# Patient Record
Sex: MALE
[De-identification: names, ages, dates, MRNs, and addresses within clinical notes are randomized per-mention and may not be internally consistent; named-entity substitution may affect disease eponyms.]

---

## 2017-03-20 ENCOUNTER — HOSPITAL ENCOUNTER (OUTPATIENT)
Dept: HOSPITAL 42 - ENDO | Age: 42
Discharge: HOME | End: 2017-03-20
Payer: MEDICAID

## 2017-03-20 VITALS
RESPIRATION RATE: 18 BRPM | SYSTOLIC BLOOD PRESSURE: 128 MMHG | TEMPERATURE: 98.2 F | DIASTOLIC BLOOD PRESSURE: 73 MMHG | OXYGEN SATURATION: 98 %

## 2017-03-20 VITALS — BODY MASS INDEX: 37.3 KG/M2

## 2017-03-20 VITALS — HEART RATE: 73 BPM

## 2017-03-20 DIAGNOSIS — B96.81: ICD-10-CM

## 2017-03-20 DIAGNOSIS — R05: ICD-10-CM

## 2017-03-20 DIAGNOSIS — K21.9: Primary | ICD-10-CM

## 2017-03-20 DIAGNOSIS — K29.50: ICD-10-CM

## 2017-03-20 PROCEDURE — 88305 TISSUE EXAM BY PATHOLOGIST: CPT

## 2017-03-20 PROCEDURE — 43239 EGD BIOPSY SINGLE/MULTIPLE: CPT

## 2017-03-20 PROCEDURE — 88342 IMHCHEM/IMCYTCHM 1ST ANTB: CPT

## 2017-03-20 PROCEDURE — 88312 SPECIAL STAINS GROUP 1: CPT

## 2017-08-05 ENCOUNTER — HOSPITAL ENCOUNTER (EMERGENCY)
Dept: HOSPITAL 42 - ED | Age: 42
Discharge: HOME | End: 2017-08-05
Payer: MEDICAID

## 2017-08-05 VITALS
HEART RATE: 101 BPM | DIASTOLIC BLOOD PRESSURE: 89 MMHG | SYSTOLIC BLOOD PRESSURE: 135 MMHG | RESPIRATION RATE: 16 BRPM | OXYGEN SATURATION: 98 % | TEMPERATURE: 98.1 F

## 2017-08-05 VITALS — BODY MASS INDEX: 37.3 KG/M2

## 2017-08-05 DIAGNOSIS — M79.671: ICD-10-CM

## 2017-08-05 DIAGNOSIS — M72.2: Primary | ICD-10-CM

## 2017-08-05 NOTE — ED PDOC
Arrival/HPI





- General


Chief Complaint: Lower Extremity Problem/Injury


Time Seen by Provider: 08/05/17 21:27


Historian: Patient





- History of Present Illness


Narrative History of Present Illness (Text): 


08/05/17 22:06


42 year old male, with no significant past medical history, who presents to the 

Emergency department complaining of atraumatic right foot pain to the bottom of 

his foot. Patient states pain is worse with walking or bearing weight on the 

extremity. Patient states he has not experienced similar pain previously. 

Patient denies any fever, chills, joint pain, numbness/weakness/tingling in the 

extremity, back pain, neck pain, headache, dizziness, or any other complaints.


PMD Stuart





Symptom Onset: Gradual


Symptom Course: Unchanged


Activities at Onset: Light


Context: Standing, Walking, Home





Past Medical History





- Provider Review


Nursing Documentation Reviewed: Yes





- Infectious Disease


Hx of Infectious Diseases: None





- Past Medical History


Past Medical History: No Previous





- Cardiac


Hx Pacemaker: No





- Pulmonary


Hx Respiratory Disorders: Yes


Hx Bronchitis: Yes


Hx Pneumonia: Yes





- Neurological


Hx Paralysis: No





- HEENT


Hx HEENT Disorder: No





- Renal


Hx Renal Disorder: No





- Endocrine/Metabolic


Hx Endocrine Disorders: No





- Hematological/Oncological


Hx Blood Transfusions: No


Hx Blood Transfusion Reaction: No





- Integumentary


Hx Dermatological Disorder: No





- Musculoskeletal/Rheumatological


Hx Musculoskeletal Disorders: Yes





- Gastrointestinal


Hx Gastrointestinal Disorders: Yes (PERIRECTAL ABSCESS)


Other/Comment: Gallstones





- Genitourinary/Gynecological


Hx Genitourinary Disorders: No





- Psychiatric


Hx Emotional Abuse: No


Hx Physical Abuse: No


Hx Substance Use: No





- Past Surgical History


Past Surgical History: No Previous





- Surgical History


Other/Comment: anal fistula/fissure repair





- Anesthesia


Hx Anesthesia Reactions: No


Hx Malignant Hyperthermia: No





- Suicidal Assessment


Feels Threatened In Home Enviroment: No





Family/Social History





- Physician Review


Nursing Documentation Reviewed: Yes


Family/Social History: Unknown Family HX


Smoking Status: Never Smoked


Hx Alcohol Use: No


Hx Substance Use: No


Hx Substance Use Treatment: No





Allergies/Home Meds


Allergies/Adverse Reactions: 


Allergies





shrimp Allergy (Intermediate, Verified 03/13/17 12:46)


 RASH


SEASONAL Allergy (Intermediate, Uncoded 03/13/17 12:46)


 UNKNOWN


 PT RECENTLY HAD ALLERGY TESTING- SHOWWED MULTIPLE REACTIONS TO MOLD, TREES 











Review of Systems





- Physician Review


All systems were reviewed & negative as marked: Yes





- Review of Systems


Constitutional: Normal.  absent: Fevers


Eyes: Normal


ENT: Normal


Respiratory: Normal.  absent: SOB, Cough


Cardiovascular: Normal.  absent: Chest Pain


Gastrointestinal: Normal.  absent: Abdominal Pain, Diarrhea, Nausea, Vomiting


Genitourinary Male: Normal.  absent: Dysuria, Frequency, Hematuria, Urinary 

Output Changes


Musculoskeletal: Other (+right foot pain).  absent: Arthralgias, Back Pain, 

Neck Pain


Skin: Normal.  absent: Rash


Neurological: Normal.  absent: Headache, Dizziness


Endocrine: Normal


Hemo/Lymphatic: Normal


Psychiatric: Normal





Physical Exam


Vital Signs Reviewed: Yes


Vital Signs











  Temp Pulse Resp BP Pulse Ox


 


 08/05/17 21:22  98.1 F  101 H  16  135/89  98











Temperature: Afebrile


Blood Pressure: Normal


Pulse: Regular


Respiratory Rate: Normal


Appearance: Positive for: Well-Appearing, Non-Toxic, Comfortable


Pain Distress: None


Mental Status: Positive for: Alert and Oriented X 3





- Systems Exam


Head: Present: Atraumatic, Normocephalic


Pupils: Present: PERRL


Extroacular Muscles: Present: EOMI


Conjunctiva: Present: Normal


Mouth: Present: Moist Mucous Membranes


Lower Extremity: Present: NORMAL PULSES, Normal ROM, Tenderness, 

Neurovascularly Intact (Tenderness to plantar aspect of right foot), Capillary 

Refill < 2 s.  No: Edema, Swelling, Erythema, Deformity


Neurological: Present: GCS=15, CN II-XII Intact, Speech Normal


Skin: Present: Warm, Dry, Normal Color.  No: Rashes


Psychiatric: Present: Alert, Oriented x 3, Normal Insight, Normal Concentration





Medical Decision Making


ED Course and Treatment: 


08/05/17 22:06


Impression:


42 year old male complaining of right foot pain to plantar aspect.





Differential Diagnosis included but are not limited to:  plantar fasciitis vs. 

tendonitis vs. gout





Plan:


-- Naproxen


-- XR Right Foot


-- Reassess and disposition





Progress Notes:


XR right foot: +heel spur, +faint soft tissue swelling to the plantar aspect of 

the foot and the ankle, no fracture, no dislocation, as read by PA


Patient advised that official radiology read of XR is still pending and will 

call the patient if there is any discrepancy within 24 hours.  





X-ray results reviewed and discussed with the patient in great detail. Patient 

advised of likely diagnosis of plantar fasciitis and the need for follow-up 

with a foot specialist. Advised to rest affected foot, elevate and apply ice. 

Otherwise instructed to follow up with podiatry referral in 1-2 days without 

fail. Advised to take medication as prescribed. Return to the emergency room at 

any time for any new or worsening symptoms.


Patient states he fully agrees with and understands discharge instructions. 

States that he agrees with the plan and disposition. Verbalized and repeated 

discharge instructions and plan. I have given the patient opportunity to ask 

any additional questions. 








- RAD Interpretation


Radiology Orders: 








08/05/17 22:09


FOOT RIGHT 3 VIEWS ROUTINE [RAD] Stat 














- Medication Orders


Current Medication Orders: 











Discontinued Medications





Naproxen (Anaprox Ds)  550 mg PO ONCE STA


   Stop: 08/05/17 22:10


   Last Admin: 08/05/17 22:31  Dose: 550 mg











- PA / NP / Resident Statement


MD/DO has reviewed & agrees with the documentation as recorded.





- Scribe Statement


The provider has reviewed the documentation as recorded by the Scribe


Linda Donovan





All medical record entries made by the Scribe were at my direction and 

personally dictated by me. I have reviewed the chart and agree that the record 

accurately reflects my personal performance of the history, physical exam, 

medical decision making, and the department course for this patient. I have 

also personally directed, reviewed, and agree with the discharge instructions 

and disposition.





Disposition/Present on Arrival





- Present on Arrival


Any Indicators Present on Arrival: No


History of DVT/PE: No


History of Uncontrolled Diabetes: No


Urinary Catheter: No


History of Decub. Ulcer: No


History Surgical Site Infection Following: None





- Disposition


Have Diagnosis and Disposition been Completed?: Yes


Diagnosis: 


 Foot pain, right, Plantar fasciitis of right foot





Disposition: HOME/ ROUTINE


Disposition Time: 23:00


Patient Plan: Discharge


Patient Problems: 


 Current Active Problems











Problem Status Onset


 


Foot pain, right Acute  


 


Plantar fasciitis of right foot Acute  











Condition: STABLE


Discharge Instructions (ExitCare):  Plantar Fasciitis (ED)


Print Language: ENGLISH


Additional Instructions: 


Thank you for letting us take care of you today. You were treated for right 

foot pain, plantar fasciitis. The emergency medical care you received today was 

directed at your acute symptoms. If you were prescribed any medication, please 

fill it and take as directed. It may take several days for your symptoms to 

resolve. Return to the Emergency Department if your symptoms worsen, do not 

improve, or if you have any other problems.





Please contact your doctor in 2 days for re-evaluation and follow up / or call 

one of the physicians/clinics you have been referred to that are listed on the 

Patient Visit Information form that is included in your discharge packet. Bring 

any paperwork you were given at discharge with you along with any medications 

you are taking to your follow up visit. Our treatment cannot replace ongoing 

medical care by a primary care provider (PCP) outside of the emergency 

department.





Thank you for allowing the [facility] team to be part of your care today.








Prescriptions: 


Naproxen 500 mg PO BID #30 tab


Referrals: 


Cheryl Stuart MD [Primary Care Provider] - Follow up with primary


Forms:  [employer]gance (English)

## 2018-04-11 ENCOUNTER — HOSPITAL ENCOUNTER (EMERGENCY)
Dept: HOSPITAL 42 - ED | Age: 43
Discharge: HOME | End: 2018-04-11
Payer: MEDICAID

## 2018-04-11 VITALS
HEART RATE: 77 BPM | SYSTOLIC BLOOD PRESSURE: 138 MMHG | DIASTOLIC BLOOD PRESSURE: 65 MMHG | RESPIRATION RATE: 18 BRPM | OXYGEN SATURATION: 98 % | TEMPERATURE: 98 F

## 2018-04-11 VITALS — BODY MASS INDEX: 36.2 KG/M2

## 2018-04-11 DIAGNOSIS — K76.0: ICD-10-CM

## 2018-04-11 DIAGNOSIS — N50.3: Primary | ICD-10-CM

## 2018-04-11 DIAGNOSIS — R16.1: ICD-10-CM

## 2018-04-11 DIAGNOSIS — R10.9: ICD-10-CM

## 2018-04-11 LAB
ALBUMIN SERPL-MCNC: 4.6 G/DL (ref 3–4.8)
ALBUMIN/GLOB SERPL: 1.4 {RATIO} (ref 1.1–1.8)
ALT SERPL-CCNC: 101 U/L (ref 7–56)
APPEARANCE UR: CLEAR
AST SERPL-CCNC: 43 U/L (ref 17–59)
BASOPHILS # BLD AUTO: 0.03 K/MM3 (ref 0–2)
BASOPHILS NFR BLD: 0.5 % (ref 0–3)
BILIRUB UR-MCNC: NEGATIVE MG/DL
BUN SERPL-MCNC: 20 MG/DL (ref 7–21)
CALCIUM SERPL-MCNC: 10.4 MG/DL (ref 8.4–10.5)
COLOR UR: YELLOW
EOSINOPHIL # BLD: 0.1 10*3/UL (ref 0–0.7)
EOSINOPHIL NFR BLD: 2 % (ref 1.5–5)
ERYTHROCYTE [DISTWIDTH] IN BLOOD BY AUTOMATED COUNT: 13.5 % (ref 11.5–14.5)
GFR NON-AFRICAN AMERICAN: > 60
GLUCOSE UR STRIP-MCNC: NEGATIVE MG/DL
GRANULOCYTES # BLD: 3.16 10*3/UL (ref 1.4–6.5)
GRANULOCYTES NFR BLD: 49.5 % (ref 50–68)
HGB BLD-MCNC: 15.5 G/DL (ref 14–18)
LEUKOCYTE ESTERASE UR-ACNC: NEGATIVE LEU/UL
LIPASE SERPL-CCNC: 64 U/L (ref 23–300)
LYMPHOCYTES # BLD: 2.4 10*3/UL (ref 1.2–3.4)
LYMPHOCYTES NFR BLD AUTO: 37.5 % (ref 22–35)
MCH RBC QN AUTO: 31.4 PG (ref 25–35)
MCHC RBC AUTO-ENTMCNC: 36 G/DL (ref 31–37)
MCV RBC AUTO: 87.2 FL (ref 80–105)
MONOCYTES # BLD AUTO: 0.7 10*3/UL (ref 0.1–0.6)
MONOCYTES NFR BLD: 10.5 % (ref 1–6)
PH UR STRIP: 5.5 [PH] (ref 4.7–8)
PLATELET # BLD: 226 10^3/UL (ref 120–450)
PMV BLD AUTO: 9.7 FL (ref 7–11)
PROT UR STRIP-MCNC: NEGATIVE MG/DL
RBC # BLD AUTO: 4.94 10^6/UL (ref 3.5–6.1)
RBC # UR STRIP: NEGATIVE /UL
SP GR UR STRIP: >= 1.03 (ref 1–1.03)
UROBILINOGEN UR STRIP-ACNC: 0.2 E.U./DL
WBC # BLD AUTO: 6.4 10^3/UL (ref 4.5–11)

## 2018-04-11 PROCEDURE — 81003 URINALYSIS AUTO W/O SCOPE: CPT

## 2018-04-11 PROCEDURE — 74177 CT ABD & PELVIS W/CONTRAST: CPT

## 2018-04-11 PROCEDURE — 96361 HYDRATE IV INFUSION ADD-ON: CPT

## 2018-04-11 PROCEDURE — 80053 COMPREHEN METABOLIC PANEL: CPT

## 2018-04-11 PROCEDURE — 93975 VASCULAR STUDY: CPT

## 2018-04-11 PROCEDURE — 83690 ASSAY OF LIPASE: CPT

## 2018-04-11 PROCEDURE — 96374 THER/PROPH/DIAG INJ IV PUSH: CPT

## 2018-04-11 PROCEDURE — 99285 EMERGENCY DEPT VISIT HI MDM: CPT

## 2018-04-11 PROCEDURE — 85025 COMPLETE CBC W/AUTO DIFF WBC: CPT

## 2018-04-11 NOTE — CT
PROCEDURE:  CT Abdomen and Pelvis with contrast



HISTORY:

Right-sided abdominal pain, 2 weeks duration. 



COMPARISON:

None



TECHNIQUE:

Contrast dose: 150 cc Omnipaque 350.



Radiation dose:



Total exam DLP = 1184.53 mGy-cm.



This CT exam was performed using one or more of the following dose 

reduction techniques: Automated exposure control, adjustment of the 

mA and/or kV according to patient size, and/or use of iterative 

reconstruction technique.



FINDINGS:



LOWER THORAX:

Unremarkable. 



LIVER:

 Hepatic steatosis. No focal masses.  No intrahepatic bile duct 

dilatation or perihepatic ascites.  



GALLBLADDER AND BILE DUCTS:

Unremarkable. 



PANCREAS:

Unremarkable. No gross lesion or ductal dilatation.



SPLEEN:

Splenomegaly.  Orthogonal measurements 7.1 x 13.9 cm. 



ADRENALS:

Unremarkable. No mass. 



KIDNEYS AND URETERS:

Unremarkable. No hydronephrosis. No solid mass. 



VASCULATURE:

Unremarkable. No aortic aneurysm. 



BOWEL:

Unremarkable. No obstruction. No gross mural thickening. 



APPENDIX:

Normal appendix. 



PERITONEUM:

Unremarkable. No free fluid. No free air. 



LYMPH NODES:

Unremarkable. No enlarged lymph nodes. 



BLADDER:

Unremarkable. 



REPRODUCTIVE:

Unremarkable. 



BONES:

No acute fracture. 



OTHER FINDINGS:

None.



IMPRESSION:

No significant or acute  findings to account for/ related to the 

clinical presentation.



Additional benign and/or incidental findings described above.

## 2018-04-11 NOTE — ED PDOC
Arrival/HPI





- General


Chief Complaint: Male Genitourinary


Time Seen by Provider: 04/11/18 10:01


Historian: Patient





- History of Present Illness


Narrative History of Present Illness (Text): 





04/11/18 15:47


42yr old male presents today with a 2 week history of right-sided groin pain 

radiating into the abdomen. Patient states pain started approximately 2 weeks 

ago when he got home from work. Patient denies nausea/vomiting or diarrhea. pt 

denies cp or sob. pt states he does heavy lifting at work and is worried about 

hernia. pt states he has noticed that he has been having harder bowel 

movements. pt denies dysuria. denies urinary frequency. pt denies back pain. pt 

states pain starts deep around the right testicle and radiates to the right 

upper abdomen. pt states pain only occurs with movement. pt states while at 

rest he has no pain. no fever/chills. no other complaints. 





Past Medical History





- Provider Review


Nursing Documentation Reviewed: Yes





- Travel History


Have you recently traveled outside [place] w/in the past 3 mons?: No





- Infectious Disease


Hx of Infectious Diseases: None





- Past Medical History


Past Medical History: No Previous





- Cardiac


Hx Cardiac Disorders: No


Hx Pacemaker: No





- Pulmonary


Hx Respiratory Disorders: Yes


Hx Bronchitis: Yes


Hx Pneumonia: Yes





- Neurological


Hx Neurological Disorder: No





- HEENT


Hx HEENT Disorder: No





- Renal


Hx Renal Disorder: No





- Endocrine/Metabolic


Hx Endocrine Disorders: No





- Hematological/Oncological


Hx Blood Disorders: No





- Integumentary


Hx Dermatological Disorder: No





- Musculoskeletal/Rheumatological


Hx Musculoskeletal Disorders: Yes





- Gastrointestinal


Hx Gastrointestinal Disorders: Yes (PERIRECTAL ABSCESS)


Other/Comment: Gallstones





- Genitourinary/Gynecological


Hx Genitourinary Disorders: No





- Psychiatric


Hx Psychophysiologic Disorder: No


Hx Substance Use: No





- Past Surgical History


Past Surgical History: No Previous





- Surgical History


Other/Comment: anal fistula/fissure repair





- Anesthesia


Hx Anesthesia Reactions: No


Hx Malignant Hyperthermia: No





- Suicidal Assessment


Feels Threatened In Home Enviroment: No





Family/Social History





- Physician Review


Nursing Documentation Reviewed: Yes


Family/Social History: Unknown Family HX


Smoking Status: Never Smoked


Hx Alcohol Use: No


Hx Substance Use: No


Hx Substance Use Treatment: No





Allergies/Home Meds


Allergies/Adverse Reactions: 


Allergies





shrimp Allergy (Intermediate, Verified 04/11/18 09:45)


 RASH


SEASONAL Allergy (Intermediate, Uncoded 04/11/18 09:45)


 UNKNOWN


 PT RECENTLY HAD ALLERGY TESTING- SHOWWED MULTIPLE REACTIONS TO MOLD, TREES 











Review of Systems





- Review of Systems


Constitutional: absent: Fatigue, Fevers


Respiratory: absent: SOB, Cough


Cardiovascular: absent: Chest Pain, Palpitations


Gastrointestinal: Abdominal Pain.  absent: Constipation, Diarrhea, Nausea, 

Vomiting


Genitourinary Male: Other (right sided testicular pain).  absent: Dysuria, 

Frequency, Hematuria


Musculoskeletal: absent: Arthralgias, Back Pain, Neck Pain


Skin: absent: Rash, Pruritis


Neurological: absent: Headache, Dizziness


Psychiatric: absent: Anxiety, Depression, Suicidal Ideation





Physical Exam


Vital Signs Reviewed: Yes


Vital Signs











  Temp Pulse Resp BP Pulse Ox


 


 04/11/18 16:02  98 F  77  18  138/65  98


 


 04/11/18 14:09   70  16  122/71  99


 


 04/11/18 12:00   70  16  125/83  100


 


 04/11/18 09:33  99.5 F  78  18  148/97 H  97











Temperature: Afebrile


Blood Pressure: Hypertensive


Pulse: Regular


Respiratory Rate: Normal


Appearance: Positive for: Well-Appearing, Non-Toxic, Comfortable


Pain Distress: None


Mental Status: Positive for: Alert and Oriented X 3





- Systems Exam


Head: Present: Atraumatic


Mouth: Present: Moist Mucous Membranes


Neck: Present: Normal Range of Motion


Respiratory/Chest: Present: Clear to Auscultation, Good Air Exchange.  No: 

Respiratory Distress, Accessory Muscle Use


Cardiovascular: Present: Regular Rate and Rhythm, Normal S1, S2.  No: Murmurs


Abdomen: Present: Tenderness (+ right lower abdominal tenderness; ).  No: 

Distention, Peritoneal Signs, Rebound, Guarding, Hernias


Genitourinary Male: Present: Normal External Genitalia, Circumcised Penis, 

Testicle Tenderness (minimal right sided tenderness), Other (chaparoned by 

ivelisse coats RN. ).  No: Lesions, Penile Discharge, Penile Swelling, Masses, 

Erythema, Hernias, Testicle Swelling


Back: Present: Normal Inspection.  No: CVA Tenderness, Midline Tenderness, 

Paraspinal Tenderness


Upper Extremity: Present: Normal ROM


Lower Extremity: Present: Normal Inspection


Neurological: Present: GCS=15, Speech Normal


Skin: Present: Warm, Dry, Normal Color.  No: Rashes


Psychiatric: Present: Alert, Oriented x 3





Medical Decision Making


ED Course and Treatment: 





04/11/18 15:54


Patient is nontoxic well appearing with stable vital signs presenting with 2 

week history of right sided abdominal pain





CBC wnl


CMP wnl


Lipase wnl





Urinalysis: wnl





Ultrasound testicular:FINDINGS:


RIGHT TESTICLE:


Measures 2.6 x 3.2 x 5.1 cm. Normal echotexture and flow.


RIGHT EPIDIDYMIS:


Epididymal head measures 1.2 x 1.2 cm. Right epididymal tubular structure 

likely a cyst 1 x 1.2 by 2.1 cm


LEFT TESTICLE:


Measures 2.6 x 3.1 x 5.2 cm. Normal echotexture and flow.


LEFT EPIDIDYMIS:


Epididymal head measures 1.2 x 1.1 cm. Tubular structure in the body of the 

left epididymis 0.9 x 3.6 cm


HYDROCELE:


None.


VARICOCELE:


Unilateral, left.


OTHER FINDINGS:


None. 


IMPRESSION:


Negative study for epididymitis, orchitis or torsion.


Bilateral tubular epididymal cysts.








CAT scan:FINDINGS:


LOWER THORAX:


Unremarkable. 


LIVER:


 Hepatic steatosis. No focal masses.  No intrahepatic bile duct dilatation or 

perihepatic ascites.  


GALLBLADDER AND BILE DUCTS:


Unremarkable. 


PANCREAS:


Unremarkable. No gross lesion or ductal dilatation.


SPLEEN:


Splenomegaly.  Orthogonal measurements 7.1 x 13.9 cm. 


ADRENALS:


Unremarkable. No mass. 


KIDNEYS AND URETERS:


Unremarkable. No hydronephrosis. No solid mass. 


VASCULATURE:


Unremarkable. No aortic aneurysm. 


BOWEL:


Unremarkable. No obstruction. No gross mural thickening. 


APPENDIX:


Normal appendix. 


PERITONEUM:


Unremarkable. No free fluid. No free air. 


LYMPH NODES:


Unremarkable. No enlarged lymph nodes. 


BLADDER:


Unremarkable. 


REPRODUCTIVE:


Unremarkable. 


BONES:


No acute fracture. 


OTHER FINDINGS:


None.


IMPRESSION:


No significant or acute  findings to account for/ related to the clinical 

presentation.


Additional benign and/or incidental findings described above.








Patient reassessment: pt denies any pain at rest; abdomen is non tender; 





i discussed all results in depth with patient; advised f/u with pmd and GI 

specialist. i advised f/u with urologist. i advised immediate return if 

symptoms worsen,persist or if new symptoms develop. 





Patient verbalizes understanding of discharge instructions and need for 

immediate followup.








all aspects of this case were discussed the attending of record. 











Impression: Abdominal pain, groin pain


Motrin every 6 hours as needed for pain


Flexeril 1 tablet every 8 hours as needed for muscle spasms; may cause 

drowsiness. 


Follow up with the GI specialist within the next 2 days. 


Follow up with the urologist within the next 2 days. 


Follow up with primary care physician within the next 2 days


Return immediately if symptoms worsen persist or if new symptoms develop: High 

fevers, increasing pain, vomiting, diarrhea or any other concerning symptoms 

develop





- Lab Interpretations


Lab Results: 








 04/11/18 10:30 





 04/11/18 10:30 





 Lab Results





04/11/18 10:30: WBC 6.4, RBC 4.94, Hgb 15.5, Hct 43.1, MCV 87.2, MCH 31.4, MCHC 

36.0, RDW 13.5, Plt Count 226, MPV 9.7, Gran % 49.5 L, Lymph % (Auto) 37.5 H, 

Mono % (Auto) 10.5 H, Eos % (Auto) 2.0, Baso % (Auto) 0.5, Gran # 3.16, Lymph # 

(Auto) 2.4, Mono # (Auto) 0.7 H, Eos # (Auto) 0.1, Baso # (Auto) 0.03


04/11/18 10:30: Sodium 140, Potassium 4.3, Chloride 105, Carbon Dioxide 24, 

Anion Gap 15, BUN 20, Creatinine 0.7 L, Est GFR ( Amer) > 60, Est GFR (

Non-Af Amer) > 60, Random Glucose 104, Calcium 10.4, Total Bilirubin 0.5, AST 43

,  H, Alkaline Phosphatase 86, Total Protein 7.8, Albumin 4.6, Globulin 

3.2, Albumin/Globulin Ratio 1.4, Lipase 64


04/11/18 10:30: Urine Color Yellow, Urine Appearance Clear, Urine pH 5.5, Ur 

Specific Gravity >= 1.030, Urine Protein Negative, Urine Glucose (UA) Negative, 

Urine Ketones Negative, Urine Blood Negative, Urine Nitrate Negative, Urine 

Bilirubin Negative, Urine Urobilinogen 0.2, Ur Leukocyte Esterase Negative











- RAD Interpretation


Radiology Orders: 








04/11/18 10:50


ABD & PELVIS IV CONTRAST ONLY [CT] Stat 





04/11/18 14:15


TESTES DUPLEX COMPLETE [US] Stat 














- Medication Orders


Current Medication Orders: 











Discontinued Medications





Sodium Chloride (Sodium Chloride 0.9%)  1,000 mls @ 999 mls/hr IV .Q1H1M STA


   Stop: 04/11/18 11:03


   Last Admin: 04/11/18 10:34  Dose: 999 mls/hr





eMAR Start Stop


 Document     04/11/18 10:34  RAFFI  (Rec: 04/11/18 10:36  RAFFI  [id number])


     Intravenous Solution


      Start Date                                 04/11/18


      Start Time                                 10:35


      End Date                                   04/11/18


      End time                                   11:35


      Total Infusion Time                        60





Ketorolac Tromethamine (Toradol)  30 mg IVP STAT STA


   Stop: 04/11/18 10:52


   Last Admin: 04/11/18 12:15  Dose: 30 mg





MAR Pain Assessment


 Document     04/11/18 12:15  RAFFI  (Rec: 04/11/18 12:15  RAFFI  [id number])


     Pain Reassessment


      Is this a pain reassessment?               Yes


     Presence of Pain


      Presence of Pain                           Yes


     Pain Scale Used


      Pain Scale Used                            Numeric


     Location


      Left, Right or Bilateral                   Right


      Pain Location Body Site                    Groin


     Description


      Intensity of Pain at present               6


IVP Administration


 Document     04/11/18 12:15  RAFFI  (Rec: 04/11/18 12:15  RAFFI  [id number])


     Charges for Administration


      # of IVP Administrations                   1














Disposition/Present on Arrival





- Present on Arrival


Any Indicators Present on Arrival: No


History of DVT/PE: No


History of Uncontrolled Diabetes: No


Urinary Catheter: No


History of Decub. Ulcer: No


History Surgical Site Infection Following: None





- Disposition


Have Diagnosis and Disposition been Completed?: Yes


Diagnosis: 


 Abdominal pain, Epididymal cyst, Hepatic steatosis, Splenomegaly





Disposition: HOME/ ROUTINE


Disposition Time: 16:01


Patient Plan: Discharge


Condition: GOOD


Discharge Instructions (ExitCare):  Acute Abdomen (Belly Pain)


Additional Instructions: 


Motrin every 6 hours as needed for pain


Flexeril 1 tablet every 8 hours as needed for muscle spasms; may cause 

drowsiness. 


Follow up with the GI specialist within the next 2 days. 


Follow up with the urologist within the next 2 days. 


Follow up with primary care physician within the next 2 days


Return immediately if symptoms worsen persist or if new symptoms develop: High 

fevers, increasing pain, vomiting, diarrhea or any other concerning symptoms 

develop


Prescriptions: 


Cyclobenzaprine [Cyclobenzaprine HCl] 10 mg PO Q8 #10 tab


Ibuprofen [Motrin] 600 mg PO Q6H PRN #20 tab


 PRN Reason: pain/fever reduction


Referrals: 


Sahni,Francisco Javier S, DO [Staff Provider] - Follow up with primary


Fredy Cote MD [Staff Provider] - Follow up with primary


Hi Keating MD [Staff Provider] - Follow up with primary


Zak Packer MD [Medical Doctor] - Follow up with primary


Forms:  [employer] (English)

## 2018-04-11 NOTE — US
HISTORY:

right sided testicular pain



TECHNIQUE:

Realtime sonography through the scrotum with color and doppler flow.



COMPARISON:

None Available.



FINDINGS:



RIGHT TESTICLE:

Measures 2.6 x 3.2 x 5.1 cm. Normal echotexture and flow.



RIGHT EPIDIDYMIS:

Epididymal head measures 1.2 x 1.2 cm. Right epididymal tubular 

structure likely a cyst 1 x 1.2 by 2.1 cm



LEFT TESTICLE:

Measures 2.6 x 3.1 x 5.2 cm. Normal echotexture and flow.



LEFT EPIDIDYMIS:

Epididymal head measures 1.2 x 1.1 cm. Tubular structure in the body 

of the left epididymis 0.9 x 3.6 cm



HYDROCELE:

None.



VARICOCELE:

Unilateral, left.



OTHER FINDINGS:

None. 



IMPRESSION:

Negative study for epididymitis, orchitis or torsion.



Bilateral tubular epididymal cysts.